# Patient Record
Sex: FEMALE | Race: WHITE | NOT HISPANIC OR LATINO | Employment: UNEMPLOYED | URBAN - METROPOLITAN AREA
[De-identification: names, ages, dates, MRNs, and addresses within clinical notes are randomized per-mention and may not be internally consistent; named-entity substitution may affect disease eponyms.]

---

## 2020-02-29 ENCOUNTER — HOSPITAL ENCOUNTER (EMERGENCY)
Facility: HOSPITAL | Age: 40
Discharge: HOME | End: 2020-02-29
Attending: EMERGENCY MEDICINE
Payer: MEDICAID

## 2020-02-29 VITALS
WEIGHT: 152.88 LBS | RESPIRATION RATE: 18 BRPM | OXYGEN SATURATION: 100 % | SYSTOLIC BLOOD PRESSURE: 119 MMHG | DIASTOLIC BLOOD PRESSURE: 64 MMHG | BODY MASS INDEX: 24.57 KG/M2 | TEMPERATURE: 99 F | HEIGHT: 66 IN | HEART RATE: 102 BPM

## 2020-02-29 DIAGNOSIS — B34.9 ACUTE VIRAL SYNDROME: Primary | ICD-10-CM

## 2020-02-29 DIAGNOSIS — R05.9 COUGH: ICD-10-CM

## 2020-02-29 DIAGNOSIS — R07.9 CHEST PAIN: ICD-10-CM

## 2020-02-29 PROCEDURE — 99284 EMERGENCY DEPT VISIT MOD MDM: CPT | Mod: 25

## 2020-02-29 PROCEDURE — 63600175 PHARM REV CODE 636 W HCPCS: Performed by: NURSE PRACTITIONER

## 2020-02-29 PROCEDURE — 25000003 PHARM REV CODE 250: Performed by: NURSE PRACTITIONER

## 2020-02-29 PROCEDURE — 94640 AIRWAY INHALATION TREATMENT: CPT

## 2020-02-29 PROCEDURE — 93005 ELECTROCARDIOGRAM TRACING: CPT

## 2020-02-29 PROCEDURE — 93010 EKG 12-LEAD: ICD-10-PCS | Mod: ,,, | Performed by: INTERNAL MEDICINE

## 2020-02-29 PROCEDURE — 96372 THER/PROPH/DIAG INJ SC/IM: CPT

## 2020-02-29 PROCEDURE — 93010 ELECTROCARDIOGRAM REPORT: CPT | Mod: ,,, | Performed by: INTERNAL MEDICINE

## 2020-02-29 PROCEDURE — 94761 N-INVAS EAR/PLS OXIMETRY MLT: CPT

## 2020-02-29 PROCEDURE — 25000242 PHARM REV CODE 250 ALT 637 W/ HCPCS: Performed by: NURSE PRACTITIONER

## 2020-02-29 RX ORDER — FLUTICASONE PROPIONATE 50 MCG
1 SPRAY, SUSPENSION (ML) NASAL 2 TIMES DAILY PRN
Qty: 15 G | Refills: 0 | Status: SHIPPED | OUTPATIENT
Start: 2020-02-29

## 2020-02-29 RX ORDER — DEXAMETHASONE SODIUM PHOSPHATE 4 MG/ML
10 INJECTION, SOLUTION INTRA-ARTICULAR; INTRALESIONAL; INTRAMUSCULAR; INTRAVENOUS; SOFT TISSUE
Status: COMPLETED | OUTPATIENT
Start: 2020-02-29 | End: 2020-02-29

## 2020-02-29 RX ORDER — IPRATROPIUM BROMIDE AND ALBUTEROL SULFATE 2.5; .5 MG/3ML; MG/3ML
3 SOLUTION RESPIRATORY (INHALATION)
Status: COMPLETED | OUTPATIENT
Start: 2020-02-29 | End: 2020-02-29

## 2020-02-29 RX ORDER — OSELTAMIVIR PHOSPHATE 45 MG/1
45 CAPSULE ORAL 2 TIMES DAILY
COMMUNITY

## 2020-02-29 RX ORDER — DICLOFENAC SODIUM 50 MG/1
50 TABLET, DELAYED RELEASE ORAL 3 TIMES DAILY PRN
Qty: 15 TABLET | Refills: 0 | Status: SHIPPED | OUTPATIENT
Start: 2020-02-29

## 2020-02-29 RX ORDER — PROMETHAZINE HYDROCHLORIDE AND CODEINE PHOSPHATE 6.25; 1 MG/5ML; MG/5ML
5 SOLUTION ORAL
Status: COMPLETED | OUTPATIENT
Start: 2020-02-29 | End: 2020-02-29

## 2020-02-29 RX ORDER — PROMETHAZINE HYDROCHLORIDE AND DEXTROMETHORPHAN HYDROBROMIDE 6.25; 15 MG/5ML; MG/5ML
5 SYRUP ORAL 3 TIMES DAILY PRN
Qty: 180 ML | Refills: 0 | Status: SHIPPED | OUTPATIENT
Start: 2020-02-29 | End: 2020-03-10

## 2020-02-29 RX ADMIN — DEXAMETHASONE SODIUM PHOSPHATE 10 MG: 4 INJECTION, SOLUTION INTRA-ARTICULAR; INTRALESIONAL; INTRAMUSCULAR; INTRAVENOUS; SOFT TISSUE at 09:02

## 2020-02-29 RX ADMIN — IPRATROPIUM BROMIDE AND ALBUTEROL SULFATE 3 ML: .5; 3 SOLUTION RESPIRATORY (INHALATION) at 07:02

## 2020-02-29 RX ADMIN — PROMETHAZINE HYDROCHLORIDE AND CODEINE PHOSPHATE 5 ML: 6.25; 1 SOLUTION ORAL at 08:02

## 2020-03-01 NOTE — ED PROVIDER NOTES
SCRIBE #1 NOTE: I, Danyell Gonzalez, am scribing for, and in the presence of, Obi Aceves NP. I have scribed the entire note.      History      Chief Complaint   Patient presents with    Influenza     dx with flu 3 days ago; states persistent productive cough, nasal congestion, and chest/back soreness from coughing       Review of patient's allergies indicates:  No Known Allergies     HPI   HPI    2/29/2020, 7:23 PM   History obtained from the patient      History of Present Illness: Mercedes Collins is a 40 y.o. female patient who presents to the Emergency Department for an evaluation of flu like symptoms which onset gradually more than 3 days PTA. Symptoms are constant and moderate in severity. The patient states that she was evaluated and diagnosed with the flu 3 days ago. She reports that despite her compliance with the prescribed Tamiflu, she has found no relief. She presents now to the ED complaining of a persistent cough productive of a green sputum and nasal congestion. Associated sxs include cough induced chest and back pain. Patient denies any fever, chills, abdominal pain, HA, SOB, diaphoresis, palpitations, extremity weakness, numbness, leg swelling, dizziness, n/v, and all other sxs at this time. Prior Tx includes the aforementioned Tamiflu. No further complaints or concerns at this time.     Arrival mode: Personal vehicle    PCP: Dasha Grey MD       Past Medical History:  Past Medical History:   Diagnosis Date    Abnormal Pap smear     ADD (attention deficit disorder)     Adult ADHD     Anxiety     Benign colon polyp     Colon polyp     Repeat colonoscopy 10/2018    Diverticulitis     Endocarditis     External hemorrhoid     Internal hemorrhoids with other complication     IV drug abuse complicating pregnancy     Tobacco use        Past Surgical History:  Past Surgical History:   Procedure Laterality Date    Cervical cone biopsy      for abnormal pap smear         Family  History:  Family History   Problem Relation Age of Onset    Stroke Paternal Grandmother     Hypertension Mother     Emphysema Father     Cancer Paternal Grandfather         Lung cancer (smoker)    Diabetes Neg Hx     Heart disease Neg Hx        Social History:  Social History     Tobacco Use    Smoking status: Current Every Day Smoker     Packs/day: 0.50     Types: Cigarettes    Smokeless tobacco: Never Used    Tobacco comment: Tyring to cut back.   Substance and Sexual Activity    Alcohol use: No    Drug use: No     Comment: Without use for 11 months    Sexual activity: Yes     Partners: Male       ROS   Review of Systems   Constitutional: Negative for chills, diaphoresis and fever.   HENT: Positive for congestion. Negative for sore throat.    Respiratory: Positive for cough (productive; green sputum). Negative for shortness of breath.    Cardiovascular: Positive for chest pain (cough induced). Negative for palpitations and leg swelling.   Gastrointestinal: Negative for abdominal pain, nausea and vomiting.   Genitourinary: Negative for dysuria.   Musculoskeletal: Positive for back pain.   Skin: Negative for rash.   Neurological: Negative for dizziness, weakness, numbness and headaches.   Hematological: Does not bruise/bleed easily.   All other systems reviewed and are negative.      Physical Exam      Initial Vitals [02/29/20 1903]   BP Pulse Resp Temp SpO2   119/64 104 20 98.8 °F (37.1 °C) 100 %      MAP       --          Physical Exam  Nursing Notes and Vital Signs Reviewed.  Constitutional: Patient is in no acute distress. Well-developed and well-nourished.  Head: Atraumatic. Normocephalic.  Eyes: PERRL. EOM intact. Conjunctivae are not pale. No scleral icterus.  ENT: Mucous membranes are moist. Oropharynx is clear and symmetric.    Neck: Supple. Full ROM. No lymphadenopathy.  Cardiovascular: Regular rate. Regular rhythm. No murmurs, rubs, or gallops. Distal pulses are 2+ and  "symmetric.  Pulmonary/Chest: No respiratory distress. Wheezing to the let upper and lower lobes of the lungs. No rales.  Abdominal: Soft and non-distended.  There is no tenderness.  No rebound, guarding, or rigidity. Good bowel sounds.  Genitourinary: No CVA tenderness  Musculoskeletal: Moves all extremities. No obvious deformities. No edema. No calf tenderness.  Skin: Warm and dry.  Neurological:  Alert, awake, and appropriate.  Normal speech.  No acute focal neurological deficits are appreciated.  Psychiatric: Normal affect. Good eye contact. Appropriate in content.    ED Course    Procedures  ED Vital Signs:  Vitals:    02/29/20 1903 02/29/20 1943 02/29/20 2125   BP: 119/64     Pulse: 104 106 102   Resp: 20 18 18   Temp: 98.8 °F (37.1 °C)     TempSrc: Oral     SpO2: 100% 100% 100%   Weight: 69.3 kg (152 lb 14.2 oz)     Height: 5' 6" (1.676 m)         Abnormal Lab Results:  Labs Reviewed - No data to display     All Lab Results: NONE    Imaging Results:  Imaging Results          X-Ray Chest PA And Lateral (Final result)  Result time 02/29/20 20:48:16    Final result by Italo Jane MD (02/29/20 20:48:16)                 Impression:      No acute findings.      Electronically signed by: Italo Jane MD  Date:    02/29/2020  Time:    20:48             Narrative:    EXAMINATION:  XR CHEST PA AND LATERAL    CLINICAL HISTORY:  Cough    TECHNIQUE:  PA and lateral views of the chest were performed.    COMPARISON:  11/12/2014    FINDINGS:  The cardiac and mediastinal silhouettes appear within normal limits.   The lungs are clear bilaterally.  No acute osseous findings demonstrated.                               The EKG was ordered, reviewed, and independently interpreted by the ED provider.  Interpretation time: 19:41  Rate: 99 BPM  Rhythm: sinus rhythm with short W[place]  Interpretation: No acute ST changes. No STEMI.  When compared to EKG performed on 04 FEB 2013, there are no significant changes.           The Emergency " Provider reviewed the vital signs and test results, which are outlined above.    ED Discussion     8:55 PM: Reassessed pt at this time.  Pt states her condition has improved at this time. Discussed with pt all pertinent ED information and results. Discussed pt dx and plan of tx. Gave pt all f/u and return to the ED instructions. All questions and concerns were addressed at this time. Pt expresses understanding of information and instructions, and is comfortable with plan to discharge. Pt is stable for discharge.    Patient presents with upper respiratory and flulike symptoms. Based on my assessment in the ED, I do not suspect any respiratory, airway, pulmonary, cardiovascular (including myocarditis), metabolic, CNS, medical, or surgical emergency medical condition. I have discussed with the patient and/or caregiver signs and symptoms for secondary bacterial infections, such as pneumonia. I believe that the patient's symptoms are most consistent with a viral illness, possibly influenza. Patient is safe for discharge home with conservative therapy.    I discussed with patient and/or family/caretaker that evaluation in the ED does not suggest any emergent or life threatening medical conditions requiring immediate intervention beyond what was provided in the ED, and I believe patient is safe for discharge.  Regardless, an unremarkable evaluation in the ED does not preclude the development or presence of a serious of life threatening condition. As such, patient was instructed to return immediately for any worsening or change in current symptoms.         ED Medication(s):  Medications   albuterol-ipratropium 2.5 mg-0.5 mg/3 mL nebulizer solution 3 mL (3 mLs Nebulization Given 2/29/20 1949)   promethazine-codeine 6.25-10 mg/5 ml syrup 5 mL (5 mLs Oral Given 2/29/20 2042)   dexamethasone injection 10 mg (10 mg Intramuscular Given 2/29/20 2100)       Follow-up Information     Schedule an appointment as soon as possible for a  visit  with Dasha Grey MD.    Specialty:  Family Medicine  Contact information:  [address]  [facility] [address] [phone]             [facility] - .    Specialty:  Emergency Medicine  Why:  As needed, If symptoms worsen  Contact information:  [address]  [facility] [address] [phone]                Discharge Medication List as of 2/29/2020  8:55 PM      START taking these medications    Details   diclofenac (VOLTAREN) 50 MG EC tablet Take 1 tablet (50 mg total) by mouth 3 (three) times daily as needed., Starting Sat 2/29/2020, Print      fluticasone propionate (FLONASE) 50 mcg/actuation nasal spray 1 spray (50 mcg total) by Each Nostril route 2 (two) times daily as needed., Starting Sat 2/29/2020, Print      promethazine-dextromethorphan (PROMETHAZINE-DM) 6.25-15 mg/5 mL Syrp Take 5 mLs by mouth 3 (three) times daily as needed., Starting Sat 2/29/2020, Until Tue 3/10/2020, Print                 Medical Decision Making    Medical Decision Making:   Clinical Tests:   Radiological Study: Ordered and Reviewed  Medical Tests: Ordered and Reviewed           Scribe Attestation:   Scribe #1: I performed the above scribed service and the documentation accurately describes the services I performed. I attest to the accuracy of the note.    Attending:   Physician Attestation Statement for Scribe #1: I, Obi Aceves NP, personally performed the services described in this documentation, as scribed by Danyell Gonzalez, in my presence, and it is both accurate and complete.          Clinical Impression       ICD-10-CM ICD-9-CM   1. Acute viral syndrome B34.9 079.99   2. Chest pain R07.9 786.50   3. Cough R05 786.2       Disposition:   Disposition: Discharged  Condition: Stable         Obi Aceves NP  03/01/20 0653

## 2020-03-03 ENCOUNTER — NURSE TRIAGE (OUTPATIENT)
Dept: ADMINISTRATIVE | Facility: CLINIC | Age: 40
End: 2020-03-03

## 2020-03-04 NOTE — TELEPHONE ENCOUNTER
Patient called in stating that she was seen in the ER and at Urgent care for flu.  Patient reports that her symptoms are getting worse.  Advised patient to be seen in the ER.  Patient verbalized understanding.     Reason for Disposition   Nursing judgment or information in reference    Protocols used: NO GUIDELINE [id number]

## 2020-03-05 NOTE — TELEPHONE ENCOUNTER
Pt states she did not go to the ER as advised by triage nurse. Pt states she is feeling better today.